# Patient Record
Sex: FEMALE | Race: BLACK OR AFRICAN AMERICAN | Employment: UNEMPLOYED | URBAN - METROPOLITAN AREA
[De-identification: names, ages, dates, MRNs, and addresses within clinical notes are randomized per-mention and may not be internally consistent; named-entity substitution may affect disease eponyms.]

---

## 2020-01-07 ENCOUNTER — HOSPITAL ENCOUNTER (EMERGENCY)
Age: 35
Discharge: STILL ADMITTED OR ADMINISTRATIVE | End: 2020-01-07
Attending: EMERGENCY MEDICINE

## 2021-02-24 ENCOUNTER — APPOINTMENT (OUTPATIENT)
Dept: PHYSICAL THERAPY | Age: 36
End: 2021-02-24

## 2021-07-27 PROCEDURE — 99281 EMR DPT VST MAYX REQ PHY/QHP: CPT

## 2021-07-28 ENCOUNTER — HOSPITAL ENCOUNTER (EMERGENCY)
Age: 36
Discharge: HOME | End: 2021-07-28
Attending: EMERGENCY MEDICINE
Payer: MEDICAID

## 2021-07-28 VITALS
OXYGEN SATURATION: 98 % | BODY MASS INDEX: 32.44 KG/M2 | DIASTOLIC BLOOD PRESSURE: 87 MMHG | HEART RATE: 62 BPM | WEIGHT: 189 LBS | RESPIRATION RATE: 18 BRPM | SYSTOLIC BLOOD PRESSURE: 157 MMHG | TEMPERATURE: 98.8 F

## 2021-07-28 DIAGNOSIS — L30.1 DYSHIDROTIC ECZEMA: Primary | ICD-10-CM

## 2021-07-28 RX ORDER — PERMETHRIN 50 MG/G
CREAM TOPICAL
Qty: 60 G | Refills: 0 | Status: SHIPPED | OUTPATIENT
Start: 2021-07-28 | End: 2021-08-27

## 2021-07-28 RX ORDER — TRIAMCINOLONE ACETONIDE 5 MG/G
OINTMENT TOPICAL 2 TIMES DAILY
Qty: 30 G | Refills: 0 | Status: SHIPPED | OUTPATIENT
Start: 2021-07-28

## 2021-07-28 RX ORDER — PERMETHRIN 50 MG/G
CREAM TOPICAL
Qty: 60 G | Refills: 0 | OUTPATIENT
Start: 2021-07-28 | End: 2021-07-28 | Stop reason: SDUPTHER

## 2021-07-28 RX ORDER — CEPHALEXIN 500 MG/1
500 CAPSULE ORAL 4 TIMES DAILY
Qty: 28 CAPSULE | Refills: 0 | Status: SHIPPED | OUTPATIENT
Start: 2021-07-28 | End: 2021-08-04

## 2025-02-21 NOTE — ED PROVIDER NOTES
EMERGENCY DEPARTMENT HISTORY AND PHYSICAL EXAM      Date: 7/28/2021  Patient Name: Oj Copeland    History of Presenting Illness     Chief Complaint   Patient presents with    Skin Problem       History (Context): Oj Copeland is a 39 y.o. woman with past medical history as noted below who presents with a rash of her arms and hands, also on feet. On review of systems, the patient denies fever, chills, numbness, tingling, shortness of breath, nausea, vomiting, throat swelling, other swelling. PCP: None    Current Outpatient Medications   Medication Sig Dispense Refill    triamcinolone acetonide (KENALOG) 0.5 % ointment Apply  to affected area two (2) times a day. use thin layer 30 g 0    cephALEXin (Keflex) 500 mg capsule Take 1 Capsule by mouth four (4) times daily for 7 days. 28 Capsule 0    permethrin (ACTICIN) 5 % topical cream Take if kenalog doesn't work. Apply over entire body. 60 g 0    SPRINTEC, 28, 0.25-35 mg-mcg tab   11       Past History     Past Medical History:  History reviewed. No pertinent past medical history. Past Surgical History:  History reviewed. No pertinent surgical history. Family History:  Family History   Problem Relation Age of Onset    Asthma Mother        Social History:  Social History     Tobacco Use    Smoking status: Never Smoker    Smokeless tobacco: Never Used   Substance Use Topics    Alcohol use: Yes     Comment: rarely    Drug use: Not on file       Allergies:  No Known Allergies    PMH, PSH, family history, social history, allergies reviewed with the patient with significant items noted above. Review of Systems   As per HPI, otherwise reviewed and negative.      Physical Exam     Vitals:    07/28/21 0205   BP: (!) 157/87   Pulse: 62   Resp: 18   Temp: 98.8 °F (37.1 °C)   SpO2: 98%   Weight: 85.7 kg (189 lb)       Gen: Well-appearing, in no acute distress  HEENT: Normocephalic, sclera anicteric  Cardiovascular: Normal rate, regular rhythm, no murmurs, Last hemoglobin A1c 7.4   rubs, gallops. Pulses intact and equal distally. Pulmonary: No respiratory distress. No stridor. Clear lungs. ABD: Soft, nontender, nondistended. Neuro: Alert. Normal speech. Normal mentation. Psych: Normal thought content and thought processes. : No CVA tenderness  EXT: Moves all extremities well. No cyanosis or clubbing. Skin: Rash: Multiple rash predominance in the flexor areas along the palms and soles. Diagnostic Study Results     Labs -   No results found for this or any previous visit (from the past 12 hour(s)). Radiologic Studies -   No orders to display     CT Results  (Last 48 hours)    None        CXR Results  (Last 48 hours)    None            Medical Decision Making   I am the first provider for this patient. I reviewed the vital signs, available nursing notes, past medical history, past surgical history, family history and social history. Vital Signs-Reviewed the patient's vital signs. Records Reviewed: Personally, on initial evaluation    MDM:   Patient presents with rash. Exam significant for rash predominantly in flexor areas  DDX considered: Dyshidrotic eczema, scabies  DDX thought to be less likely but also considered due to high risk condition: SJS, cellulitis, necrotizing fasciitis, vasculitis, microvascular hemolytic disease    Patient condition on initial evaluation: Stable    Plan:   Orders as below:  Orders Placed This Encounter    triamcinolone acetonide (KENALOG) 0.5 % ointment    DISCONTD: permethrin (ACTICIN) 5 % topical cream    cephALEXin (Keflex) 500 mg capsule    permethrin (ACTICIN) 5 % topical cream        Rash is likely dyshidrotic eczema. Discharge home. Patient condition at time of disposition: Stable  DISCHARGE NOTE:   Care plan outlined and precautions discussed. All medications were reviewed with the patient; will d/c home with steroids. All of pt's questions and concerns were addressed.   Alarm symptoms and return precautions associated with chief complaint and evaluation were reviewed with the patient in detail. The patient demonstrated adequate understanding. Patient was instructed and agrees to follow up with PCP, as well as to return to the ED upon further deterioration. Patient is ready to go home. The patient is happy with this plan    Follow-up Information     Follow up With Specialties Details Why Contact Info    Emily Mendez MD Family Medicine Go to  As needed, If symptoms worsen [address] [place] [address] [id number]            Discharge Medication List as of 7/28/2021  2:17 AM      START taking these medications    Details   triamcinolone acetonide (KENALOG) 0.5 % ointment Apply  to affected area two (2) times a day. use thin layer, Normal, Disp-30 g, R-0      cephALEXin (Keflex) 500 mg capsule Take 1 Capsule by mouth four (4) times daily for 7 days. , Normal, Disp-28 Capsule, R-0         CONTINUE these medications which have CHANGED    Details   permethrin (ACTICIN) 5 % topical cream Take if kenalog doesn't work. Apply over entire body. , Print, Disp-60 g, R-0         CONTINUE these medications which have NOT CHANGED    Details   [address] [facility], , 0.25-35 mg-mcg tab Historical Med, R-11, GELY               Disposition: Discharge    Diagnosis     Clinical Impression:   1. Dyshidrotic eczema        Signed,  Sterling Melo MD  Emergency Physician  ALBERTO Ortiz    As a voice dictation software was utilized to dictate this note, minor word transpositions can occur. I apologize for confusing wording and typographic errors.   Please feel free to contact me for clarification.'

## 2025-05-22 ENCOUNTER — TRANSCRIBE ORDERS (OUTPATIENT)
Facility: HOSPITAL | Age: 40
End: 2025-05-22

## 2025-05-22 DIAGNOSIS — Z12.31 ENCOUNTER FOR SCREENING MAMMOGRAM FOR MALIGNANT NEOPLASM OF BREAST: Primary | ICD-10-CM
